# Patient Record
Sex: MALE | Race: WHITE
[De-identification: names, ages, dates, MRNs, and addresses within clinical notes are randomized per-mention and may not be internally consistent; named-entity substitution may affect disease eponyms.]

---

## 2017-12-28 ENCOUNTER — HOSPITAL ENCOUNTER (EMERGENCY)
Dept: HOSPITAL 89 - ER | Age: 62
Discharge: HOME | End: 2017-12-28
Payer: MEDICARE

## 2017-12-28 VITALS — WEIGHT: 230 LBS | HEIGHT: 73 IN | BODY MASS INDEX: 30.48 KG/M2

## 2017-12-28 VITALS — DIASTOLIC BLOOD PRESSURE: 104 MMHG | SYSTOLIC BLOOD PRESSURE: 168 MMHG

## 2017-12-28 DIAGNOSIS — M25.462: Primary | ICD-10-CM
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
## 2017-12-28 PROCEDURE — 99282 EMERGENCY DEPT VISIT SF MDM: CPT

## 2017-12-28 PROCEDURE — 73564 X-RAY EXAM KNEE 4 OR MORE: CPT

## 2017-12-28 NOTE — ER REPORT
History and Physical


Time Seen By MD:  13:00


Hx. of Stated Complaint:  


PATIENT STATES THAT ON TUESDAY HE FELL ON THE ICE AND STATES THAT HIS KNEE PAIN 


HAS BEEN GETTING WORSE


HPI/ROS


CHIEF COMPLAINT: Left knee pain, fall





HISTORY OF PRESENT ILLNESS: Patient is a 62-year-old male who presents the ED 

with complaint of left knee pain after he fell 2 days ago. He has not noted any 

swelling or bruising. He states that most of his knee pain is on the lateral 

and posterior aspect. He states that when he fell he heard a popping noise. He 

states he has been taking Tylenol with some minimal relief. He states that he 

has pain with bearing weight. He denies any pain in his left hip.





REVIEW OF SYSTEMS:


Constitutional: No fever, no chills.


Cardiovascular: No chest pain, no palpitations.


Respiratory: No cough, no shortness of breath.


Musculoskeletal: See history of present illness.


Skin: No rashes.


Neurological: No headache.


Allergies:  


Coded Allergies:  


     No Known Drug Allergies (Verified , 12/28/17)


Home Meds


Reported Medications


Indomethacin (INDOMETHACIN) 75 Mg Capsule.er, PO


   12/28/17


Allopurinol (ALLOPURINOL) 100 Mg Tablet, PO BID, TAB


   12/28/17


Aspirin (Children's Aspirin) 81 Mg Tab.chew


   12/28/17


Discontinued Reported Medications


Acetaminophen/Hydrocodone (Lortab 5/325 Mg) 5 Mg/325 Mg Tab, 1 TAB PO Q4H, #10


   4/25/12


Ketorolac Tromethamine (Toradol) 10 Mg Tab, 10 MG PO Q6H for 3 Days


   4/25/12


Reviewed Nurses Notes:  Yes


Old Medical Records Reviewed:  Yes


Hx Smoking:  No


Hx Substance Use Disorder:  No


Hx Alcohol Use:  No


Constitutional





Vital Sign - Last 24 Hours








 12/28/17





 12:57


 


Temp 98.3


 


Pulse 86


 


Resp 18


 


B/P (MAP) 168/104


 


Pulse Ox 95


 


O2 Delivery Room Air








Physical Exam


  General Appearance: The patient is alert, has no immediate need for airway 

protection and no current signs of toxicity. Patient appears to be no acute 

distress.


Respiratory: Chest is non tender, lungs are clear to auscultation.


Cardiac: regular rate and rhythm 


Musculoskeletal:  Neck: Neck is supple and non tender.


There is left lateral posterior area knee pain with palpation. No swelling or 

ecchymosis appreciated. There is no erythema noted. PT and DP pulses are 2+ 

with normal capillary refill. Normal sensation. Negative anterior and posterior 

drawer sign. Pain with varus valgus stress test. Some pain with Shan's.


Skin: No rashes or lesions.





Medical Decision Making


EKG/Imaging


Imaging


Left Knee Xrays:





IMPRESSION:


1.  Multicompartmental degenerative changes small joint effusion.  No acute 

bony finding.


 


Report Dictated By: Neeraj Macario MD at 12/28/2017 1:39 PM


 


Report E-Signed By: Neeraj Macario MD  at 12/28/2017 1:45 PM





ED Course/Re-evaluation


ED Course


 12/28/2017 2:01:58 pm - discussed x-ray results with patient. There is a small 

joint effusion as well as evidence of possible quadriceps injury. We'll place 

patient in a knee immobilizer and crutches. Prescribed some pain medication to 

take meantime. Will refer him to orthopedics for further evaluation.


Decision to Disposition Date:  Dec 28, 2017


Decision to Disposition Time:  14:04





Depart


Departure


Latest Vital Signs





Vital Signs








  Date Time  Temp Pulse Resp B/P (MAP) Pulse Ox O2 Delivery O2 Flow Rate FiO2


 


12/28/17 12:57 98.3 86 18 168/104 95 Room Air  








Impression:  


 Primary Impression:  


 Left knee injury


Condition:  Improved


Disposition:  HOME OR SELF-CARE


Referrals:  


WALLY CAR (PCP)











[facility]


New Scripts


Tramadol Hcl (TRAMADOL HCL) 50 Mg Tablet


50 MG PO Q4-6H, #10 TAB


   Prov: ZOILA PIMENTEL PA-C         12/28/17


Patient Instructions:  Knee Pain (ED)





Additional Instructions:  


Rest, ice, heat. He is crutches and knee brace as needed. Follow-up with 

orthopedics and primary care provider in 2-3 days. If having any worsening or 

concerning symptoms may return to the emergency department.





Problem Qualifiers








 Primary Impression:  


 Left knee injury


 Encounter type:  initial encounter  Qualified Codes:  S89.92XA - Unspecified 

injury of left lower leg, initial encounter








ZOILA PIMENTEL PA-C Dec 28, 2017 13:49

## 2017-12-28 NOTE — RADIOLOGY IMAGING REPORT
FACILITY: [facility] 

 

PATIENT NAME: Jagdish Gusman

: 1955

MR: [id number]

V: [id number]

EXAM DATE: 

ORDERING PHYSICIAN: ZOILA PIMENTEL

TECHNOLOGIST: 

 

Location: [facility]

Patient: Jagdish Gusman

: 1955

MRN: [id number]

Visit/Account:[id number]

Date of Sevice: 2017

 

ACCESSION #: [id number]

 

Left knee

 

Indication: Lateral and posterior knee pain after fall

 

Comparison: None available

 

Findings:

3 views left knee were obtained.

Degenerative narrowing and mild osteophytic changes noted in the medial compartment.  Lateral compart
ment is preserved.  Mild spurring is seen from the superior articular surface of patella.  Enthesopat
hy is noted at the insertion the quadriceps tendon.  Small joint effusion.

 

 

IMPRESSION:

1.  Multicompartmental degenerative changes small joint effusion.  No acute bony finding.

 

Report Dictated By: Neeraj Macario MD at 2017 1:39 PM

 

Report E-Signed By: Neeraj Macario MD  at 2017 1:45 PM

 

WSN:[id number]

## 2018-06-04 ENCOUNTER — HOSPITAL ENCOUNTER (OUTPATIENT)
Dept: HOSPITAL 89 - RAD | Age: 63
End: 2018-06-04
Attending: PHYSICIAN ASSISTANT
Payer: MEDICARE

## 2018-06-04 DIAGNOSIS — M47.896: Primary | ICD-10-CM

## 2018-06-04 PROCEDURE — 72100 X-RAY EXAM L-S SPINE 2/3 VWS: CPT

## 2018-06-04 NOTE — RADIOLOGY IMAGING REPORT
FACILITY: [facility] 

 

PATIENT NAME: Jagdish Gusman

: 1955

MR: [id number]

V: [id number]

EXAM DATE: 

ORDERING PHYSICIAN: HUSAM HERRMANN

TECHNOLOGIST: 

 

[place] [facility] [place]: Jagdish Gusman

: 1955

MRN: [id number]

Visit/Account:[id number]

Date of Sevice:  2018

 

ACCESSION #: [id number]

 

Exam type: LUMBAR SPINE 2 OR 3 VIEW

 

History: Left leg pain left knee pain no known injury

 

Comparison: None.

 

Findings:

 

Three views the lumbar spine were submitted.  There are five nonrib-bearing lumbar-type vertebral bod
ies present.  There is no evidence of acute fractures or subluxations.  There is moderate to severe d
isc space narrowing at L4-5 and L5-S1.  Degenerative facet joint changes are seen at L5-S1.  Also not
ed are anterior osteophytes at T10-11 T12-L1 and left lateral osteophytes at L2-3

 

IMPRESSION:

 

1.  Spondylotic changes lumbar spine although no evidence of acute fractures or subluxations

 

Report Dictated By: Sherry Naranjo MD at 2018 10:32 AM

 

Report E-Signed By: Sherry Naranjo MD  at 2018 10:34 AM

 

WSN:[id number]

## 2018-08-15 ENCOUNTER — HOSPITAL ENCOUNTER (OUTPATIENT)
Dept: HOSPITAL 89 - LAB | Age: 63
End: 2018-08-15
Attending: FAMILY MEDICINE
Payer: MEDICARE

## 2018-08-15 DIAGNOSIS — R19.7: Primary | ICD-10-CM

## 2018-08-15 PROCEDURE — 87449 NOS EACH ORGANISM AG IA: CPT

## 2018-08-15 PROCEDURE — 87045 FECES CULTURE AEROBIC BACT: CPT

## 2018-08-15 PROCEDURE — 83630 LACTOFERRIN FECAL (QUAL): CPT

## 2018-08-15 PROCEDURE — 87324 CLOSTRIDIUM AG IA: CPT

## 2019-01-16 ENCOUNTER — HOSPITAL ENCOUNTER (EMERGENCY)
Dept: HOSPITAL 89 - ER | Age: 64
Discharge: HOME | End: 2019-01-16
Payer: MEDICARE

## 2019-01-16 VITALS — DIASTOLIC BLOOD PRESSURE: 95 MMHG | SYSTOLIC BLOOD PRESSURE: 121 MMHG

## 2019-01-16 DIAGNOSIS — M79.7: Primary | ICD-10-CM

## 2019-01-16 PROCEDURE — 99281 EMR DPT VST MAYX REQ PHY/QHP: CPT

## 2019-01-16 NOTE — ER REPORT
History and Physical


Time Seen By MD:  11:21


Hx. of Stated Complaint:  


GENERALIZED PAIN.


HPI/ROS


CHIEF COMPLAINT: Generalized pain





HISTORY OF PRESENT ILLNESS: 63-year-old male patient presents to emergency room 


with complaint of generalized pain. Patient states that he hurts everywhere. 


Patient does have a history of fibromyalgia. He has seen numerous physicians 


over the years for that. He states that he's been on duloxetine and gabapentin 


with no improvement in his pain. He states that he is seeing Dr. Parham, but 


states that he is not found anything that seems to help. Patient states that he 


has been referred to pain management, however he does not want to go as he 


doesn't "want to take a drug screen". Patient states he is seen rheumatology 


which he stopped seeing. He states that he has not been given any medication 


which has been helpful, except for Lyrica. He states that made him very 


depressed and does not take that.


Allergies:  


Coded Allergies:  


     No Known Drug Allergies (Verified , 12/28/17)


Home Meds


Reported Medications


Duloxetine Hcl (CYMBALTA) 60 Mg Capsule.dr, 60 MG PO QDAY, #5 CAP


   1/16/19


Lisinopril (LISINOPRIL) 10 Mg Tablet, 10 MG PO QDAY, TAB


   1/16/19


Indomethacin (INDOMETHACIN) 75 Mg Capsule.er, PO


   12/28/17


Allopurinol (ALLOPURINOL) 100 Mg Tablet, PO BID, TAB


   12/28/17


Aspirin (Children's Aspirin) 81 Mg Tab.chew


   12/28/17


Discontinued Scripts


Hydrocodone Bit/Acetaminophen (NORCO 5-325 TABLET) 1 Each Tablet, 1 EACH PO Q6H 


PRN for PAIN, #10 TAB


   Prov:ZOILA PIMENTEL PA-C         12/28/17


Tramadol Hcl (TRAMADOL HCL) 50 Mg Tablet, 50 MG PO Q4-6H, #10 TAB


   Prov:ZOILA PIMENTEL PA-C         12/28/17


Past Medical/Surgical History


Patient has a past medical history of heart murmur, fibromyalgia.


Patient has a surgical history of aVR surgery 2, left thumb surgery, distal 


nerve surgery.


Reviewed Nurses Notes:  Yes


Hx Smoking:  No


Hx Substance Use Disorder:  No


Hx Alcohol Use:  No


Constitutional





Vital Sign - Last 24 Hours








 1/16/19 1/16/19 1/16/19 1/16/19





 11:13 11:15 11:21 11:30


 


Temp  97.7  


 


Pulse  76 65 


 


Resp  14  


 


B/P (MAP) 164/117 (133) 164/117  150/107 (121)


 


Pulse Ox  96 94 


 


O2 Delivery  Room Air Room Air 


 


    





 1/16/19 1/16/19 1/16/19 1/16/19





 11:36 11:51 12:00 12:06


 


Pulse 59 63  75


 


B/P (MAP)   121/95 (104) 


 


Pulse Ox 95 93  96


 


O2 Delivery Room Air Room Air  








Physical Exam


  General Appearance: The patient is alert, has no immediate need for airway 


protection and no current signs of toxicity. 


Respiratory: Chest is non tender, lungs are clear to auscultation.


Cardiac: regular rate and rhythm


Gastrointestinal: Abdomen is soft and non tender, no masses, bowel sounds 


normal.


Musculoskeletal:  Neck: Neck is supple and non tender.


   Extremities have full range of motion and are tender throughout.


Skin: No rashes or lesions.





DIFFERENTIAL DIAGNOSIS: After history and physical exam differential diagnosis 


was considered for fibromyalgia, depression, anxiety.





Medical Decision Making


ED Course/Re-evaluation


ED Course


Patient was admitted to exam room, history and physical were obtained. 


Differential diagnoses were considered. On examination lungs are clear, heart 


regular, abdomen is soft and nontender. Patient does have tenderness everywhere.


With patient having fibromyalgia just see any point in going ahead and doing any


lab results or imaging. I discussed with patient about doing pain management. 


Patient states that he does not want to go to pain management as he will have to


have a drug screen monthly. Patient states that he does not do drugs. I did ask 


the patient what medications have worked in the past. He states that there have 


not been any medications at work. He states that he has tried tramadol, numerous


times, he's also try Toradol all with no improvement. He states he isn't 


prescribed gabapentin. I did look up on the pharmacy prescription drug 


monitoring program and saw that he had received gabapentin the last time in 


August of last year. Patient denies having any improvement. He states that he 


will not take any medications at work. Patient has not seen a physiatrist as of 


yet. We will go ahead and refer him to [place]. Since he is not had any 


improvement with any medication we will not prescribe any at this time and have 


him follow-up elsewhere. Patient verbalized understanding and agreement with 


plan.


Decision to Disposition Date:  Jan 16, 2019


Decision to Disposition Time:  12:14





Depart


Departure


Latest Vital Signs





Vital Signs








  Date Time  Temp Pulse Resp B/P (MAP) Pulse Ox O2 Delivery O2 Flow Rate FiO2


 


1/16/19 12:06  75   96   


 


1/16/19 12:00    121/95 (104)    


 


1/16/19 11:51      Room Air  


 


1/16/19 11:15 97.7  14     








Impression:  


   Primary Impression:  


   Fibromyalgia


Condition:  Improved


Disposition:  HOME OR SELF-CARE


Referrals:  


VIVIAN PARHAM DO (PCP)


Patient Instructions:  Fibromyalgia (ED)





Additional Instructions:  


Follow up with Physiatrist:


Dr. Cristobal


[address] [place], [place]


[phone]


or 


Dr. Gautam


[address], [place]


[phone]


Continue with current medications.


Limit activity by pain.


Continue to monitor for triggers.











SERVANDO GARVEY                Jan 16, 2019 11:50

## 2019-02-13 ENCOUNTER — HOSPITAL ENCOUNTER (OUTPATIENT)
Dept: HOSPITAL 89 - US | Age: 64
End: 2019-02-13
Attending: INTERNAL MEDICINE
Payer: MEDICARE

## 2019-02-13 DIAGNOSIS — I07.1: ICD-10-CM

## 2019-02-13 DIAGNOSIS — I35.1: Primary | ICD-10-CM

## 2019-02-13 DIAGNOSIS — Z95.3: ICD-10-CM

## 2019-02-13 PROCEDURE — 82374 ASSAY BLOOD CARBON DIOXIDE: CPT

## 2019-02-13 PROCEDURE — 84520 ASSAY OF UREA NITROGEN: CPT

## 2019-02-13 PROCEDURE — 93306 TTE W/DOPPLER COMPLETE: CPT

## 2019-02-13 PROCEDURE — 82310 ASSAY OF CALCIUM: CPT

## 2019-02-13 PROCEDURE — 82565 ASSAY OF CREATININE: CPT

## 2019-02-13 PROCEDURE — 82947 ASSAY GLUCOSE BLOOD QUANT: CPT

## 2019-02-13 PROCEDURE — 84132 ASSAY OF SERUM POTASSIUM: CPT

## 2019-02-13 PROCEDURE — 82435 ASSAY OF BLOOD CHLORIDE: CPT

## 2019-02-13 PROCEDURE — 84295 ASSAY OF SERUM SODIUM: CPT

## 2019-02-13 PROCEDURE — 36415 COLL VENOUS BLD VENIPUNCTURE: CPT
